# Patient Record
(demographics unavailable — no encounter records)

---

## 2024-12-24 NOTE — PHYSICAL EXAM
[Normal Sclera/Conjunctiva] : normal sclera/conjunctiva [Normal] : normal rate, regular rhythm, normal S1 and S2 and no murmur heard [No Focal Deficits] : no focal deficits [Alert and Oriented x3] : oriented to person, place, and time [de-identified] : No throat erythema [de-identified] : Right anterior cervical node slightly tender

## 2024-12-24 NOTE — HISTORY OF PRESENT ILLNESS
[Congestion] : congestion [Cough] : cough [Cold Symptoms] : cold symptoms [Sore Throat] : sore throat [___ Days ago] : [unfilled] days ago [Chills] : chills [Fatigue] : fatigue [Headache] : headache [Shortness Of Breath] : no shortness of breath [Fever] : no fever [de-identified] : Throat really hurts, hurts to swallow;  has pneumonia

## 2025-03-07 NOTE — ASSESSMENT
[FreeTextEntry1] : anxiety- stable, cont prozac, low dose. xanax prn- uses sparingly healthy lifestyle fatigue- chk labs, likely due to poor sleep-  snores. try to address. declined sleep study for herself. trial melatonin. cant try ambien if needed. will let me know.  gerd- lifestyle mod, chk bone density as on ppi for long time. try to wean off.  ibs- fiber, avoid triggers, minimize stress. consistent healthy lifestyle perimenapause- chk labs. trial estroven.  Discussed healthy lifestyle,discussed and updated vaccinations, healthy diet, exercise, chk labs, discussed appropriate screening tests including colonoscopy, mammo, bone density and pap.  Discussed the importance and benefit of a healthy lifestyle including a heart healthy diet such as the Mediterranean diet and regular exercise as well as 7 hours of sleep nightly.  Discussed the importance of screening for colon cancer. Reviewed screening reccomendations including colonoscopy, Cologuard and Fit DNA testing. I strongly encouraged colonoscopy as that is the best screening test to detect both colon cancer and polyps and is the gold standard.

## 2025-03-07 NOTE — PHYSICAL EXAM
[No Acute Distress] : no acute distress [Well Nourished] : well nourished [Well Developed] : well developed [Well-Appearing] : well-appearing [Normal Sclera/Conjunctiva] : normal sclera/conjunctiva [PERRL] : pupils equal round and reactive to light [EOMI] : extraocular movements intact [Normal Outer Ear/Nose] : the outer ears and nose were normal in appearance [Normal Oropharynx] : the oropharynx was normal [No JVD] : no jugular venous distention [No Lymphadenopathy] : no lymphadenopathy [Supple] : supple [Thyroid Normal, No Nodules] : the thyroid was normal and there were no nodules present [No Respiratory Distress] : no respiratory distress  [No Accessory Muscle Use] : no accessory muscle use [Clear to Auscultation] : lungs were clear to auscultation bilaterally [Normal Rate] : normal rate  [Regular Rhythm] : with a regular rhythm [Normal S1, S2] : normal S1 and S2 [No Murmur] : no murmur heard [No Carotid Bruits] : no carotid bruits [No Abdominal Bruit] : a ~M bruit was not heard ~T in the abdomen [No Varicosities] : no varicosities [Pedal Pulses Present] : the pedal pulses are present [No Edema] : there was no peripheral edema [No Palpable Aorta] : no palpable aorta [No Extremity Clubbing/Cyanosis] : no extremity clubbing/cyanosis [Normal Appearance] : normal in appearance [No Nipple Discharge] : no nipple discharge [No Axillary Lymphadenopathy] : no axillary lymphadenopathy [Soft] : abdomen soft [Non Tender] : non-tender [Non-distended] : non-distended [No Masses] : no abdominal mass palpated [No HSM] : no HSM [Normal Bowel Sounds] : normal bowel sounds [Normal Posterior Cervical Nodes] : no posterior cervical lymphadenopathy [Normal Anterior Cervical Nodes] : no anterior cervical lymphadenopathy [No CVA Tenderness] : no CVA  tenderness [No Spinal Tenderness] : no spinal tenderness [No Joint Swelling] : no joint swelling [Grossly Normal Strength/Tone] : grossly normal strength/tone [No Rash] : no rash [Coordination Grossly Intact] : coordination grossly intact [No Focal Deficits] : no focal deficits [Normal Gait] : normal gait [Deep Tendon Reflexes (DTR)] : deep tendon reflexes were 2+ and symmetric [Normal Affect] : the affect was normal [Normal Insight/Judgement] : insight and judgment were intact [de-identified] : R inverted nipple- chronic

## 2025-03-07 NOTE — HISTORY OF PRESENT ILLNESS
[FreeTextEntry1] : ama [de-identified] : on prozac 10mg, effective. doesnt want to increase.  falls asleep ok but wakes up freq- unrefreshing sleep.  ibs- fiber, stable sees cardio- dr sandhu, annual. stress echo nl.  Otherwise feels good. Appet, , bms, voiding, all ok. no pain.  Reviewed all interim as well as relevant prior consultations, labs and radiological studies.

## 2025-03-07 NOTE — HEALTH RISK ASSESSMENT
[Good] : ~his/her~  mood as  good [Monthly or less (1 pt)] : Monthly or less (1 point) [1 or 2 (0 pts)] : 1 or 2 (0 points) [Never (0 pts)] : Never (0 points) [No] : In the past 12 months have you used drugs other than those required for medical reasons? No [No falls in past year] : Patient reported no falls in the past year [0] : 1) Little interest or pleasure doing things: Not at all (0) [1] : 2) Feeling down, depressed, or hopeless for several days (1) [PHQ-2 Negative - No further assessment needed] : PHQ-2 Negative - No further assessment needed [Yes] : Reviewed medication list for presence of high-risk medications. [Never] : Never [Patient reported mammogram was normal] : Patient reported mammogram was normal [Patient reported PAP Smear was normal] : Patient reported PAP Smear was normal [Patient reported colonoscopy was normal] : Patient reported colonoscopy was normal [With Family] : lives with family [Employed] : employed [] :  [Fully functional (bathing, dressing, toileting, transferring, walking, feeding)] : Fully functional (bathing, dressing, toileting, transferring, walking, feeding) [Fully functional (using the telephone, shopping, preparing meals, housekeeping, doing laundry, using] : Fully functional and needs no help or supervision to perform IADLs (using the telephone, shopping, preparing meals, housekeeping, doing laundry, using transportation, managing medications and managing finances) [Audit-CScore] : 1 [de-identified] : exc [de-identified] : healthy [[id number]] : 1 [Reports changes in hearing] : Reports no changes in hearing [Reports changes in vision] : Reports no changes in vision [Reports changes in dental health] : Reports no changes in dental health [MammogramDate] : 2024 [PapSmearDate] : 1/2025 [ColonoscopyDate] : 4/2021 [ColonoscopyComments] : rpt 10, denice [FreeTextEntry2] :  [de-identified] : sees optho [AdvancecareDate] : 3/7/25

## 2025-03-31 NOTE — ASSESSMENT
[FreeTextEntry1] : EXACERBATION OF CHRONIC CONDITION The patient was advised of the diagnosis. The natural history of the pathology was explained in full to the patient in layman's terms. All questions were answered. The risks and benefits of surgical and non-surgical treatment alternatives were explained in full to the patient.  Naproxen Rx sent NSAIDs recommended.  Patient warned of risk of NSAID medication to stomach and GI tract, risk of increase blood pressure, cardiac risk, and risk of fluid retention.  The patient should clear taking medication with internist/PMD if any problem with heart, blood pressure, or GI system exists.  Large joint injection of the left shoulder was performed. The indication for this procedure was pain, inflammation. The site was prepped with alcohol, ethyl chloride sprayed topically and sterile technique used. An injection of Lidocaine 3cc of 2% , Bupivacaine (Marcaine) 3cc of 0.5% , Triamcinolone (Kenalog) 2cc of 40 mg  was used.   Patient tolerated procedure well. Patient was advised to call if redness, pain or fever occur and apply ice for 15 minutes out of every hour for the next 12-24 hours as tolerated. The risks benefits, and alternatives have been discussed, and verbal consent was obtained.   recommend therapy  F/u in 4 weeks

## 2025-03-31 NOTE — HISTORY OF PRESENT ILLNESS
[Full time] : Work status: full time [de-identified] : 03/31/2025 follow up for shoulder, stated on and off pain. ROM is limited tarted last weekend, no numbness or tingling. no PT>  3/14/24:  Pt has been doing therapy and still has pain.  2/1/24:  LHD F with L arm pain radiating from shoulder down to elbow- has had workup for cards- nl  cannot sleep on that side.    PMHx: gerd Meds- pantoprazole, xanax nkda [] : Patient is currently injured and not playing sports: no

## 2025-03-31 NOTE — IMAGING
[de-identified] : left shoulder: no swelling/ecchymosis ttp over biceps farom painful arc, ER, and IR +impingement nvid

## 2025-03-31 NOTE — REASON FOR VISIT
[FreeTextEntry2] :  03/14/2024 :MING MIRELLA , a 53 year old female, presents today for left shoulder    - left shoulder to elbow pain